# Patient Record
Sex: MALE | URBAN - METROPOLITAN AREA
[De-identification: names, ages, dates, MRNs, and addresses within clinical notes are randomized per-mention and may not be internally consistent; named-entity substitution may affect disease eponyms.]

---
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
## 2023-06-29 NOTE — PROCEDURE: PRESCRIPTION MEDICATION MANAGEMENT
Called pt to reschedule appt. No answer. Lvm    
Discontinue Regimen: Isotretinoin
Render In Strict Bullet Format?: No
Initiate Treatment: Tretinoin .1%
Detail Level: Zone

## 2024-05-16 NOTE — PROCEDURE: HIGH RISK MEDICATION MONITORING
Lab Results   Component Value Date    EGFR 45 05/16/2024    EGFR 49 05/14/2024    EGFR 51 05/13/2024    CREATININE 1.08 05/16/2024    CREATININE 1.01 05/14/2024    CREATININE 0.98 05/13/2024     - Patient with chronic history of stage 3a CKD without evidence of VIMAL at this time.  - Baseline Cr 1.0-1.2.  - Avoid nephrotoxi medications and hypotension.  - Continue to monitor renal function and electrolytes as indicated.  - Outpatient follow up with PCP per routine.   Tranexamic Acid Pregnancy And Lactation Text: It is unknown if this medication is safe during pregnancy or breast feeding.

## 2024-11-27 NOTE — PROCEDURE: HIGH RISK MEDICATION MONITORING
Hospitalist Fluconazole Counseling:  Patient counseled regarding adverse effects of fluconazole including but not limited to headache, diarrhea, nausea, upset stomach, liver function test abnormalities, taste disturbance, and stomach pain.  There is a rare possibility of liver failure that can occur when taking fluconazole.  The patient understands that monitoring of LFTs and kidney function test may be required, especially at baseline. The patient verbalized understanding of the proper use and possible adverse effects of fluconazole.  All of the patient's questions and concerns were addressed.